# Patient Record
Sex: FEMALE | NOT HISPANIC OR LATINO | Employment: FULL TIME | URBAN - METROPOLITAN AREA
[De-identification: names, ages, dates, MRNs, and addresses within clinical notes are randomized per-mention and may not be internally consistent; named-entity substitution may affect disease eponyms.]

---

## 2024-08-24 PROCEDURE — 99283 EMERGENCY DEPT VISIT LOW MDM: CPT | Mod: 25

## 2024-08-24 PROCEDURE — 12002 RPR S/N/AX/GEN/TRNK2.6-7.5CM: CPT

## 2024-08-24 ASSESSMENT — COLUMBIA-SUICIDE SEVERITY RATING SCALE - C-SSRS
6. HAVE YOU EVER DONE ANYTHING, STARTED TO DO ANYTHING, OR PREPARED TO DO ANYTHING TO END YOUR LIFE?: NO
1. IN THE PAST MONTH, HAVE YOU WISHED YOU WERE DEAD OR WISHED YOU COULD GO TO SLEEP AND NOT WAKE UP?: NO
2. HAVE YOU ACTUALLY HAD ANY THOUGHTS OF KILLING YOURSELF IN THE PAST MONTH?: NO

## 2024-08-25 ENCOUNTER — HOSPITAL ENCOUNTER (EMERGENCY)
Facility: HOSPITAL | Age: 19
Discharge: HOME | End: 2024-08-25
Attending: STUDENT IN AN ORGANIZED HEALTH CARE EDUCATION/TRAINING PROGRAM | Admitting: STUDENT IN AN ORGANIZED HEALTH CARE EDUCATION/TRAINING PROGRAM
Payer: COMMERCIAL

## 2024-08-25 ENCOUNTER — APPOINTMENT (OUTPATIENT)
Dept: RADIOLOGY | Facility: HOSPITAL | Age: 19
End: 2024-08-25
Attending: EMERGENCY MEDICINE
Payer: COMMERCIAL

## 2024-08-25 VITALS
HEIGHT: 63 IN | OXYGEN SATURATION: 95 % | DIASTOLIC BLOOD PRESSURE: 59 MMHG | RESPIRATION RATE: 16 BRPM | HEART RATE: 86 BPM | BODY MASS INDEX: 28.98 KG/M2 | WEIGHT: 163.58 LBS | SYSTOLIC BLOOD PRESSURE: 109 MMHG | TEMPERATURE: 98.2 F

## 2024-08-25 DIAGNOSIS — S81.012A LACERATION OF LEFT KNEE, INITIAL ENCOUNTER: ICD-10-CM

## 2024-08-25 PROCEDURE — 250N000009 HC RX 250: Performed by: STUDENT IN AN ORGANIZED HEALTH CARE EDUCATION/TRAINING PROGRAM

## 2024-08-25 PROCEDURE — 73562 X-RAY EXAM OF KNEE 3: CPT | Mod: LT

## 2024-08-25 PROCEDURE — 250N000011 HC RX IP 250 OP 636: Performed by: EMERGENCY MEDICINE

## 2024-08-25 PROCEDURE — 90715 TDAP VACCINE 7 YRS/> IM: CPT | Performed by: EMERGENCY MEDICINE

## 2024-08-25 PROCEDURE — 90471 IMMUNIZATION ADMIN: CPT | Performed by: EMERGENCY MEDICINE

## 2024-08-25 RX ORDER — GINSENG 100 MG
CAPSULE ORAL ONCE
Status: COMPLETED | OUTPATIENT
Start: 2024-08-25 | End: 2024-08-25

## 2024-08-25 RX ORDER — BACITRACIN ZINC 500 [USP'U]/G
OINTMENT TOPICAL 2 TIMES DAILY
Qty: 28 G | Refills: 0 | Status: SHIPPED | OUTPATIENT
Start: 2024-08-25

## 2024-08-25 RX ADMIN — CLOSTRIDIUM TETANI TOXOID ANTIGEN (FORMALDEHYDE INACTIVATED), CORYNEBACTERIUM DIPHTHERIAE TOXOID ANTIGEN (FORMALDEHYDE INACTIVATED), BORDETELLA PERTUSSIS TOXOID ANTIGEN (GLUTARALDEHYDE INACTIVATED), BORDETELLA PERTUSSIS FILAMENTOUS HEMAGGLUTININ ANTIGEN (FORMALDEHYDE INACTIVATED), BORDETELLA PERTUSSIS PERTACTIN ANTIGEN, AND BORDETELLA PERTUSSIS FIMBRIAE 2/3 ANTIGEN 0.5 ML: 5; 2; 2.5; 5; 3; 5 INJECTION, SUSPENSION INTRAMUSCULAR at 01:13

## 2024-08-25 RX ADMIN — BACITRACIN: 500 OINTMENT TOPICAL at 02:28

## 2024-08-25 ASSESSMENT — ACTIVITIES OF DAILY LIVING (ADL)
ADLS_ACUITY_SCORE: 35
ADLS_ACUITY_SCORE: 35

## 2024-08-25 NOTE — ED PROVIDER NOTES
EMERGENCY DEPARTMENT ENCOUNTER       ED Course & Medical Decision Making     1:24 AM I met with the patient, obtained history, performed an initial exam, and discussed options and plan for diagnostics and treatment here in the ED.  1:58 AM Performed laceration repair.    Final Impression  19 year old female presents for evaluation of a knee laceration after mechanical fall just prior to arrival.  Patient sustained a 6 cm horizontal laceration about 3 inches inferior to the edge of her patella, does not apply directly over the joints, is mildly gaping, though not overly deep.  X-ray negative for fractures.  Tetanus updated.  Repaired with commendation of mattress sutures in the middle and simple interrupted's on either side as documented below.  Bacitracin applied, will provide prescription for bacitracin recommend return to clinic in about 2 weeks for suture removal, did discuss that this is a high tension area and that should be careful with bending it and should keep the stitches in for a full 14 days.    Prior to making a final disposition on this patient the results of patient's tests and other diagnostic studies were discussed with the patient. All questions were answered. Patient expressed understanding of the plan and was amenable to it.    Medical Decision Making    History:  Supplemental history from: N/A  External Record(s) reviewed as documented below;  [place] immunization registry, last tetanus booster was in 2015    Work Up:  Chart documentation includes differential considered and any EKGs or imaging independently interpreted by provider, where specified.  DDx considered but not limited to: Patellar fracture, knee laceration, open fracture, knee fracture    Complicating factors:  Care impacted by chronic illness: Other: LAST, major depressive disorder  Care affected by social determinants of health: N/A    Disposition considerations: Discharge. I prescribed additional prescription strength  "medication(s) as charted. See documentation for any additional details.        Medications   bacitracin ointment (has no administration in time range)   Tdap (tetanus-diphtheria-acell pertussis) (ADACEL) injection 0.5 mL (0.5 mLs Intramuscular $Given 8/25/24 0113)     New Prescriptions    BACITRACIN 500 UNIT/GM EXTERNAL OINTMENT    Apply topically 2 times daily.     Modified Medications    No medications on file     Final Impression     1. Laceration of left knee, initial encounter      Chief Complaint     Chief Complaint   Patient presents with    Laceration     Patient reports she tripped and landed on her left knee. She has a 1.5 inch laceration below her left knee cap. Bleeding is controlled.     HPI     Gricelda Mcgowan is a 19 year old female who presents for evaluation of laceration.    Patient reports she tripped walking up the stairs and cut her knee on the metal stair edge protector this evening. She sustained a laceration just below the left knee. The laceration itself is painful. She denies knee pain. She is able to walk, extend, and bare weight with her left leg without diffiuculty. No other medical concerns.    I, Charisse Kinney am serving as a scribe to document services personally performed by Dr. Charlie Hewitt MD, based on my observation and the provider's statements to me. I, Dr. Charlie Hewitt MD attest that Charisse Kinney is acting in a scribe capacity, has observed my performance of the services and has documented them in accordance with my direction.    Physical Exam     BP (!) 134/100   Pulse 87   Temp 98.2  F (36.8  C) (Oral)   Resp 16   Ht 1.6 m (5' 3\")   Wt 74.2 kg (163 lb 9.3 oz)   LMP 08/19/2024 (Exact Date)   SpO2 98%   BMI 28.98 kg/m    Constitutional: Awake, alert, in no acute distress.  Head: Normocephalic, atraumatic.  ENT: Mucous membranes moist.  Eyes: Conjunctiva normal.  Respiratory: Respirations even, unlabored, in no acute respiratory distress.  Cardiovascular: " Regular rate and rhythm. Good peripheral perfusion.  GI: Abdomen soft, non-tender.  Musculoskeletal: Moves all 4 extremities equally.  Normal flexion/extension of the knee.  Has a 6 cm horizontal laceration just inferior to the patella, mildly gaping, though does not appear overly deep  Integument: Warm, dry.  Neurologic: Alert & oriented x 3. Normal speech. Grossly normal motor and sensory function. No focal deficits noted.  Psychiatric: Normal mood    Labs & Imaging     Imaging reviewed and independently interpreted as below;   Images reviewed, no fracture seen.    Results for orders placed or performed during the hospital encounter of 08/25/24   XR Knee Left 3 Views    Impression    IMPRESSION: Normal joint spaces and alignment. No fracture or joint effusion.        Procedures     PROCEDURE: Laceration Repair   INDICATIONS: Laceration   PROCEDURE PROVIDER: Dr Charlie Hewitt   SITE: Left knee   TYPE/SIZE: simple, clean, and no foreign body visualized  6 cm (total length)   FUNCTIONAL ASSESSMENT: Distal sensation, circulation, and motor intact   MEDICATION: 5 mLs of 1% Lidocaine with epinephrine   PREPARATION: irrigation with Normal saline   DEBRIDEMENT: no debridement   CLOSURE:  Superficial layer closed with 6 stitches of 3-0 Ethilon 4 horizontal mattress and 2 simple interrupted       Total number of sutures/staples placed: 6               Charlie Hewitt MD  08/25/24 1465

## 2024-08-25 NOTE — ED TRIAGE NOTES
Patient reports she tripped and landed on her left knee. She has a 1.5 inch laceration below her left knee cap. Bleeding is controlled.    Triage Assessment (Adult)       Row Name 08/24/24 1878          Triage Assessment    Airway WDL WDL        Respiratory WDL    Respiratory WDL WDL        Peripheral/Neurovascular WDL    Peripheral Neurovascular WDL X